# Patient Record
Sex: FEMALE | Race: WHITE | NOT HISPANIC OR LATINO | Employment: OTHER | URBAN - METROPOLITAN AREA
[De-identification: names, ages, dates, MRNs, and addresses within clinical notes are randomized per-mention and may not be internally consistent; named-entity substitution may affect disease eponyms.]

---

## 2021-04-05 ENCOUNTER — TELEPHONE (OUTPATIENT)
Dept: PEDIATRICS | Facility: CLINIC | Age: 66
End: 2021-04-05

## 2021-04-13 ENCOUNTER — ANCILLARY PROCEDURE (OUTPATIENT)
Dept: NUCLEAR MEDICINE | Facility: CLINIC | Age: 66
End: 2021-04-13
Attending: PSYCHIATRY & NEUROLOGY
Payer: COMMERCIAL

## 2021-04-13 DIAGNOSIS — R20.2 TINGLING: ICD-10-CM

## 2021-04-13 DIAGNOSIS — R25.1 TREMOR, PHYSIOLOGICAL: ICD-10-CM

## 2021-04-13 DIAGNOSIS — G20.A1 PARKINSONS (H): ICD-10-CM

## 2021-04-13 DIAGNOSIS — M48.02 CERVICAL SPINAL STENOSIS: ICD-10-CM

## 2021-04-13 PROCEDURE — A9584 IODINE I-123 IOFLUPANE: HCPCS | Performed by: RADIOLOGY

## 2021-04-13 PROCEDURE — 78803 RP LOCLZJ TUM SPECT 1 AREA: CPT | Performed by: RADIOLOGY

## 2022-01-28 ENCOUNTER — OFFICE VISIT (OUTPATIENT)
Dept: DERMATOLOGY | Facility: CLINIC | Age: 67
End: 2022-01-28
Payer: COMMERCIAL

## 2022-01-28 DIAGNOSIS — L43.9 LICHEN PLANUS: Primary | ICD-10-CM

## 2022-01-28 DIAGNOSIS — R45.851 SUICIDAL IDEATION: ICD-10-CM

## 2022-01-28 PROCEDURE — 99204 OFFICE O/P NEW MOD 45 MIN: CPT | Performed by: DERMATOLOGY

## 2022-01-28 RX ORDER — HYDROXYCHLOROQUINE SULFATE 200 MG/1
TABLET, FILM COATED ORAL
Qty: 48 TABLET | Refills: 2 | Status: SHIPPED | OUTPATIENT
Start: 2022-01-28

## 2022-01-28 RX ORDER — L. ACIDOPHILUS/BIFIDO. LONGUM 15 MG
CAPSULE,DELAYED RELEASE (ENTERIC COATED) ORAL
COMMUNITY
Start: 2021-06-02

## 2022-01-28 RX ORDER — GABAPENTIN 300 MG/1
CAPSULE ORAL
COMMUNITY
Start: 2021-12-23

## 2022-01-28 RX ORDER — PAROXETINE 40 MG/1
TABLET, FILM COATED ORAL
COMMUNITY
Start: 2021-12-26

## 2022-01-28 RX ORDER — SODIUM FLUORIDE 6 MG/ML
PASTE, DENTIFRICE DENTAL
COMMUNITY
Start: 2021-07-05

## 2022-01-28 RX ORDER — HYDROCORTISONE 2.5 %
CREAM (GRAM) TOPICAL 2 TIMES DAILY
COMMUNITY

## 2022-01-28 RX ORDER — CLOBETASOL PROPIONATE 0.05 MG/G
GEL TOPICAL
COMMUNITY
Start: 2021-02-10

## 2022-01-28 RX ORDER — PROCHLORPERAZINE MALEATE 10 MG
TABLET ORAL
COMMUNITY
Start: 2021-12-23

## 2022-01-28 RX ORDER — CYCLOBENZAPRINE HCL 10 MG
TABLET ORAL
COMMUNITY
Start: 2021-12-25

## 2022-01-28 RX ORDER — MUPIROCIN 20 MG/G
OINTMENT TOPICAL 3 TIMES DAILY
COMMUNITY

## 2022-01-28 RX ORDER — FOLIC ACID 1 MG/1
1 TABLET ORAL DAILY
COMMUNITY

## 2022-01-28 RX ORDER — SIMVASTATIN 40 MG
40 TABLET ORAL DAILY
COMMUNITY
Start: 2021-12-23

## 2022-01-28 RX ORDER — NAPROXEN 250 MG/1
TABLET ORAL
COMMUNITY

## 2022-01-28 RX ORDER — MICONAZOLE NITRATE 20 MG/G
POWDER TOPICAL
COMMUNITY
Start: 2020-07-16

## 2022-01-28 RX ORDER — METFORMIN HCL 500 MG
1000 TABLET, EXTENDED RELEASE 24 HR ORAL
COMMUNITY
Start: 2021-06-02

## 2022-01-28 RX ORDER — GLIMEPIRIDE 4 MG/1
TABLET ORAL
COMMUNITY
Start: 2021-12-26

## 2022-01-28 RX ORDER — ACYCLOVIR 800 MG/1
800 TABLET ORAL
COMMUNITY

## 2022-01-28 RX ORDER — ALBUTEROL SULFATE 0.83 MG/ML
2.5 SOLUTION RESPIRATORY (INHALATION)
COMMUNITY
Start: 2021-12-23

## 2022-01-28 RX ORDER — SUMATRIPTAN 50 MG/1
50 TABLET, FILM COATED ORAL
COMMUNITY
Start: 2020-12-04

## 2022-01-28 RX ORDER — NYSTATIN 100000/ML
500000 SUSPENSION, ORAL (FINAL DOSE FORM) ORAL 4 TIMES DAILY
COMMUNITY

## 2022-01-28 RX ORDER — TOPIRAMATE 25 MG/1
1 TABLET, FILM COATED ORAL 2 TIMES DAILY
COMMUNITY
Start: 2021-12-22

## 2022-01-28 ASSESSMENT — PAIN SCALES - GENERAL: PAINLEVEL: NO PAIN (0)

## 2022-01-28 ASSESSMENT — PATIENT HEALTH QUESTIONNAIRE - PHQ9: SUM OF ALL RESPONSES TO PHQ QUESTIONS 1-9: 22

## 2022-01-28 NOTE — PATIENT INSTRUCTIONS
"Increase methotrexate to 5 pills once weekly  Continue folic acid  Please get labs checked in [facility] 1 month after increasing dose  Please call me after you get labs checked to let me know to review them    Start plaquenil 200 mg twice daily Mon-Fri and 200 mg once daily Sat/Sun  Get baseline eye exam with ophthalmologist and let them know you need Plaquenil screen    If rash on forearms flares up please let me know     Return in 3 months sooner if concerns.         Oral Health Care    You have been diagnosed with one of several diseases that can affect the mucous membranes inside your mouth. Many times, these conditions produce sensitive, painful gums or oral discomfort. In most cases, meticulous oral hygiene is recommended to assist in the management of these problems. This handout is designed to provide pertinent information regarding non-prescription products that can be used to maintain or improve the condition of your mouth. The products listed only represent a sample of what is available without a prescription and is not intended to represent a comprehensive list of over-the-counter oral health care products. If you have any questions, please contact your dentist, dental hygienist, or pharmacist.     Toothbrushes    Toothbrushes are available in a variety of shapes, sizes and degrees of stiffness in bristles. Even regular \"soft\" toothbrushes may be too \"hard\" to be used effectively for your specific condition and can damage affected gum tissue. Several options are available. In cases where there is significant gum sensitivity, running the head of the toothbrush under warm running water is often helpful to further soften the bristles. One of the best toothbrushes available is manufactured by [employer] and can be ordered by your pharmacist. Other manufacturers provide alternative toothbrushes for sensitive oral tissues. Oral-B provides their AdvantageTM brush in a \"sensitive\" model with extra-soft " "bristles. The Crest CompleteTM is also available with an \"extra-soft\" bristle option. The TechniqueTM toothbrush by Karen with \"sensitive\" bristles is indicted for use with oral tissues that are irritated. If these are not available, you can try to find a \"baby\" toothbrush that will have a small head and very soft bristles. Lastly, you can have your dentist order a specific tooth brush.    Toothpastes    Regular toothpastes are often very irritating to inflamed or ulcerated oral tissues. The \"foaming\" agent in most toothpastes, sodium lauryl sulfate or SLS, is often responsible. There are several toothpastes that are SLS-free or have substantially reduced levels of SLS. Other toothpastes for \"tartar control\" contain a pyrophosphate that may be irritating to normal healthy oral tissue in some patients. Toothpastes with these ingredients should be avoided in individuals with conditions like yours. You should carefully review the ingredient on the back of the box or tube of the toothpaste prior to use. BioteneTM toothpaste is SLS-free and also contains protective enzymes that are not normally found in saliva. It is manufactured by [employer] and can be ordered by your pharmacist. RembrandtTM Toothpaste for Canker Sore Sufferers is also SLS-free and is available in most pharmacies. Finally, [employer] toothpastes have minimal levels of SLS and are available in health food stores and many grocery stores.      How to brush your teeth    When the gums are inflamed and irritated, it is especially important that you brush your teeth correctly to remove the daily accumulation of bacteria-laden dental plaque. The \"Bass technique\" is widely accepted as an effective method to remove plaque from the teeth and surrounding gums. Using light pressure and holding the bristles at a 45-degree angle to the teeth, gently slide the tips of the bristles just slightly under the gums. You should begin on the cheek side of the upper " "back teeth and either vibrate the bristles in a gentle back-and-forth motion or move them in very small circles. In either case, do not remove the tips of the bristles from under the gum-line. As you go around the mouth to the other side and get to the last tooth, you should continue onto the palate side. Make sure to reposition the brush to the next group of 2-3 teeth, taking care to overlap placement. Once you have finished with the upper teeth, you should brush the lower teeth in exactly the same way.     Electric toothbrushes come in a variety of models. While they all do a good job of removing bacteria and food debris from the teeth and gums, none of them are superior to regular brushing and flossing. Patients who have blistering diseases that affect their gums may find that electric toothbrushes are too rough on their gums and cause them to become eroded.     Dental Floss    Dental floss is used to remove bacteria and food debris from between the teeth and under the gum line. There are several different variations of dental floss, including dental \"tape\", waxed/coated floss and flavored floss. Which one to get is a matter of personal preference; however, most patients find that a waxed/coated dental floss is easier to get between the teeth. There are also certain types of flosses that may be more \"gentle\" to the gum tissue. Oral-B UltraflossTM has spongy nylon fibers that can help to remove more plaque. Jones's Expanding flossTM with texturized fibers is considered ideal for patients with gingival sensitivity. GlideTM floss is coated with a Alzada-adeline-like material and is noted for providing increased patient comfort.     Flossing is not a complicated task. Most individuals simply wrap the end of the floss around their middle or ring fingers, stretch the floss between their index fingers and thumb, and gently \"see-saw\" the floss between the teeth until the floss is below the contact point. Once the floss is " "below the contact, you should wrap the floss around the side of the tooth in a \"C-shape\" and use an up-and-down motion, making sure to access below the gum-line. Do not simply pull the floss and \"snap\" it through the contact point, as it will cut the gums.     Mouth rinses    Many people use mouth rinses to \"freshen\" their breath; however, mouth rinses will not remove the bacteria that cause halitosis or bad breath. They only temporarily cover up the odor and are considered cosmetic mouth rinses. Patients who practice good oral hygiene usually do not need to use a mouth rinse, but may want to do so as a matter of personal preference.     The choice of mouth rinse depends on several factors. Many popular, over-the-counter mouth rinses contain alcohol ranging from 15-30%. While there is no scientific evidence that alcohol in mouthwashes has any significant negative health effects on humans, the presence of alcohol may cause a stinging sensation, especially if the gum tissue is ulcerated. Alcohol containing rinses also produce dryness of the oral mucosal tissues. This condition, known as xerostomia, may contribute to the already existing problem. Most manufacturers have, or plan to develop, alcohol-free mouthwashes. BioteneTM mouthwash by [facility] contains protective enzymes normally found in saliva and is also alcohol-free. Bausch and LombTM is another  that provides an alcohol-free mouthrinse. PeroxylTM mouthrinse, manufactured by [employer], is a hydrogen peroxide based product, that is often helpful in soothing a variety of mouth sores and irritations.     More Information    The World Wide Web provides an expansive source of information regarding oral health care products. The American Dental Association has an excellent web site at http://www.ada.org/. Those products that have received the ADA Seal of Acceptance can be found at http://www.ada.org/public/topics/seal.html. This site also lists contact " information for the companies whose products have received the ADA Seal of Acceptance. The vast majority of these products and their manufacturers have their own web sites, as well.

## 2022-01-28 NOTE — NURSING NOTE
Dermatology Rooming Note    Claudette Mitchell's goals for this visit include:   Chief Complaint   Patient presents with     Derm Problem     Lichen Planus- areas of concern in the mouth. Claudette reports a fluctuating rash on both arms     Kathryn Lugo, EMT

## 2022-01-28 NOTE — LETTER
1/28/2022       RE: Claudette Mitchell  [id number]  [place] [address]     Dear Colleague,    Thank you for referring your patient, Claudette Mitchell, to the [facility] [place] at [facility]. Please see a copy of my visit note below.    [facility] Dermatology Note  Encounter Date: Jan 28, 2022  Office Visit     Dermatology Problem List:  # Oral lichen planus, biopsy confirmed 2016.    - Pt reports negative HCV testing.    - Current rx: Methotrexate 10 mg weekly, initiated 3/10/19 (feeling jittery with 6 tablets / week). Approximately cumulative methotrexate dosage as of 1/28/22: 1,390 mg; start Plaquenil 1/28/22 and increase methotrexate to 5 tablets weekly (12.5 mg)  - Previous rx: Cellcept (severe headaches).   # Craniofacial hyperhidrosis  - Current Rx: compazine through PCP    ____________________________________________    Assessment & Plan:    # Oral lichen planus, s/p bx 2016, chronic, active.  Patient with active disease on methotrexate 10 mg weekly. She did not tolerate methotrexate 15 mg weekly. She previously failed Cellcept and topicals. Discussed options of increasing methotrexate to 12.5 mg weekly +/- adding Plaquenil versus Otezla. Given depression, thoughts of suicide, and weight loss in setting of death of her sister last year, avoiding Otezla for now.  - Increase methotrexate to 12.5 mg weekly  - Recheck CBC, CMP in 1 month  - Start Plaquenil 200 mg BID Mon-Fri and 200 mg daily Sat/Sun (weight-based)  - Obtain baseline eye exam  - Continue clobetasol gel; advised to use with dental trays; offered dexamethasone swish/spit but she will hold off for now  - Advised regular dental cares  - Offered GI referral given swallowing symptoms but she would to defer for now  *reviewed multiple outside derm notes, prior CBC, CMP    # Rash on forearms.  - Minimally present today, mostly appears xerotic  - Based  "on description, could be cutaneous LP  - Offered topical steroid but she will defer for now and let us know when is active  - Advised daily moisturization with bland emollient.     # High PHQ-9.  # Suicidal thoughts.  Patient notes suicidal thoughts but \"would never act on them\". Her sister  last year. She is in grief therapy. Declines additional resources.  - Please see RN note for documentation      Procedures Performed:   None    Follow-up: 3 months, sooner if concerns.      Staff and Scribe:     Scribe Disclosure:  I, Princess Stone, am serving as a scribe to document services personally performed by Vanessa Rosado MD based on data collection and the provider's statements to me.     Provider Disclosure:   The documentation recorded by the scribe accurately reflects the services I personally performed and the decisions made by me.    Vanessa Rosado MD    Department of Dermatology  [facility]: Phone: [phone], Fax: [phone]  2022     ____________________________________________    CC: Derm Problem (Lichen Planus- areas of concern in the mouth. Claudette reports a fluctuating rash on both arms)    HPI:  Ms. Claudette Mitchell is a(n) 66 year old female who presents today as a new patient for lichen planus. Referred by Dr. Nohemy Badillo.    Diagnosed in 2016.  Takes methotrexate 4 pills weekly. Was jittery on 6 pills. It does help. Stopped at one point but then mouth blew up.  Previously took cellcept but had severe headches.  clobetasol gel didn't help much.    No sores in vaginal area.  Trouble swallowing pills, has pain when swallowing  Has lost almost 100 lbs - mostly related to death of sister last year.  Has thoughts of suicide but would not act on it.  Goes to grief therapy weekly.    Also gets rash on forearms. Will get raised bumps.  Tried changing soaps but that didn't help.    Patient is " otherwise feeling well, without additional skin concerns.    Labs Reviewed:  See above  Physical Exam:  Vitals: There were no vitals taken for this visit.  SKIN: Focused examination of oral mucosa was performed.  - diffuse erythema along superior/inferior gingiva with one erosion left superior gingiva.  - No other lesions of concern on areas examined.     Medications:  Current Outpatient Medications   Medication     acyclovir (ZOVIRAX) 800 MG tablet     albuterol (PROVENTIL) (2.5 MG/3ML) 0.083% neb solution     blood glucose ([employer] BLOOD GLUCOSE TEST) test strip     clobetasol (TEMOVATE) 0.05 % GEL topical gel     cyclobenzaprine (FLEXERIL) 10 MG tablet     folic acid (FOLVITE) 1 MG tablet     gabapentin (NEURONTIN) 300 MG capsule     glimepiride (AMARYL) 4 MG tablet     hydrocortisone 2.5 % cream     hydroxychloroquine (PLAQUENIL) 200 MG tablet     metFORMIN (GLUCOPHAGE-XR) 500 MG 24 hr tablet     methotrexate 2.5 MG tablet     miconazole (ZEASORB-AF) 2 % external powder     mupirocin (BACTROBAN) 2 % external ointment     naproxen (NAPROSYN) 250 MG tablet     nystatin (MYCOSTATIN) 354195 UNIT/ML suspension     PARoxetine (PAXIL) 40 MG tablet     prochlorperazine (COMPAZINE) 10 MG tablet     simvastatin (ZOCOR) 40 MG tablet     SODIUM FLUORIDE 5000 PPM 1.1 % PSTE     SUMAtriptan (IMITREX) 50 MG tablet     topiramate (TOPAMAX) 25 MG tablet     vitamin B-12 (CYANOCOBALAMIN) 1000 MCG tablet     No current facility-administered medications for this visit.      Past Medical History:   There is no problem list on file for this patient.    No past medical history on file.     CC Referred Self, MD  No address on file on close of this encounter.

## 2022-01-28 NOTE — PROGRESS NOTES
"[facility] Dermatology Note  Encounter Date: 2022  Office Visit     Dermatology Problem List:  # Oral lichen planus, biopsy confirmed .    - Pt reports negative HCV testing.    - Current rx: Methotrexate 10 mg weekly, initiated 3/10/19 (feeling jittery with 6 tablets / week). Approximately cumulative methotrexate dosage as of 22: 1,390 mg; start Plaquenil 22 and increase methotrexate to 5 tablets weekly (12.5 mg)  - Previous rx: Cellcept (severe headaches).   # Craniofacial hyperhidrosis  - Current Rx: compazine through PCP    ____________________________________________    Assessment & Plan:    # Oral lichen planus, s/p bx , chronic, active.  Patient with active disease on methotrexate 10 mg weekly. She did not tolerate methotrexate 15 mg weekly. She previously failed Cellcept and topicals. Discussed options of increasing methotrexate to 12.5 mg weekly +/- adding Plaquenil versus Otezla. Given depression, thoughts of suicide, and weight loss in setting of death of her sister last year, avoiding Otezla for now.  - Increase methotrexate to 12.5 mg weekly  - Recheck CBC, CMP in 1 month  - Start Plaquenil 200 mg BID Mon-Fri and 200 mg daily Sat/Sun (weight-based)  - Obtain baseline eye exam  - Continue clobetasol gel; advised to use with dental trays; offered dexamethasone swish/spit but she will hold off for now  - Advised regular dental cares  - Offered GI referral given swallowing symptoms but she would to defer for now  *reviewed multiple outside derm notes, prior CBC, CMP    # Rash on forearms.  - Minimally present today, mostly appears xerotic  - Based on description, could be cutaneous LP  - Offered topical steroid but she will defer for now and let us know when is active  - Advised daily moisturization with bland emollient.     # High PHQ-9.  # Suicidal thoughts.  Patient notes suicidal thoughts but \"would never act on them\". Her sister  last year. She is in " grief therapy. Declines additional resources.  - Please see RN note for documentation      Procedures Performed:   None    Follow-up: 3 months, sooner if concerns.      Staff and Scribe:     Scribe Disclosure:  I, Princess Chase, am serving as a scribe to document services personally performed by Vanessa Rosado MD based on data collection and the provider's statements to me.     Provider Disclosure:   The documentation recorded by the scribe accurately reflects the services I personally performed and the decisions made by me.    Vanessa Rosado MD    Department of Dermatology  [facility]: Phone: [phone], Fax: [phone]  1/30/2022     ____________________________________________    CC: Derm Problem (Lichen Planus- areas of concern in the mouth. Claudette reports a fluctuating rash on both arms)    HPI:  Ms. Claudette Mitchell is a(n) 66 year old female who presents today as a new patient for lichen planus. Referred by Dr. Nohemy Badillo.    Diagnosed in 2016.  Takes methotrexate 4 pills weekly. Was jittery on 6 pills. It does help. Stopped at one point but then mouth blew up.  Previously took cellcept but had severe headches.  clobetasol gel didn't help much.    No sores in vaginal area.  Trouble swallowing pills, has pain when swallowing  Has lost almost 100 lbs - mostly related to death of sister last year.  Has thoughts of suicide but would not act on it.  Goes to grief therapy weekly.    Also gets rash on forearms. Will get raised bumps.  Tried changing soaps but that didn't help.    Patient is otherwise feeling well, without additional skin concerns.    Labs Reviewed:  See above  Physical Exam:  Vitals: There were no vitals taken for this visit.  SKIN: Focused examination of oral mucosa was performed.  - diffuse erythema along superior/inferior gingiva with one erosion left superior gingiva.  - No other lesions  of concern on areas examined.     Medications:  Current Outpatient Medications   Medication     acyclovir (ZOVIRAX) 800 MG tablet     albuterol (PROVENTIL) (2.5 MG/3ML) 0.083% neb solution     blood glucose ([facility] BLOOD GLUCOSE TEST) test strip     clobetasol (TEMOVATE) 0.05 % GEL topical gel     cyclobenzaprine (FLEXERIL) 10 MG tablet     folic acid (FOLVITE) 1 MG tablet     gabapentin (NEURONTIN) 300 MG capsule     glimepiride (AMARYL) 4 MG tablet     hydrocortisone 2.5 % cream     hydroxychloroquine (PLAQUENIL) 200 MG tablet     metFORMIN (GLUCOPHAGE-XR) 500 MG 24 hr tablet     methotrexate 2.5 MG tablet     miconazole (ZEASORB-AF) 2 % external powder     mupirocin (BACTROBAN) 2 % external ointment     naproxen (NAPROSYN) 250 MG tablet     nystatin (MYCOSTATIN) 991767 UNIT/ML suspension     PARoxetine (PAXIL) 40 MG tablet     prochlorperazine (COMPAZINE) 10 MG tablet     simvastatin (ZOCOR) 40 MG tablet     SODIUM FLUORIDE 5000 PPM 1.1 % PSTE     SUMAtriptan (IMITREX) 50 MG tablet     topiramate (TOPAMAX) 25 MG tablet     vitamin B-12 (CYANOCOBALAMIN) 1000 MCG tablet     No current facility-administered medications for this visit.      Past Medical History:   There is no problem list on file for this patient.    No past medical history on file.     CC Referred Self, MD  No address on file on close of this encounter.

## 2022-01-28 NOTE — NURSING NOTE
[facility] :  PHQ-9 Screening Note  SITUATION/BACKGROUND                                                    Claudette Mitchell is a 66 year old female who completed the PHQ-9 assessment for depression and Score is >9.    Onset of symptoms: improving  Trigger: sister death  Recent related events: sisters death  Prior history of suicide attempt or self harm: No  Risk Factors: recent loss of sister  History of depression or mental illness:   Medications reviewed: No     ASSESSMENT      A. Are any of the following present?      Suicidal thoughts with a plan and means to carry out the plan?    Intent to harm others    Altered mental status: confusion, delusional, psychotic NO - go to B   B. Are any of the following present?      Suicidal thoughts without a plan or means to carry out the plan    New onset of delusional ideas    Past inpatient admission for depression    New onset and recent change or addition of new medication NO - go to C   C. Are any of the following present?      Previous suicide attempts    Depression interfering with ability to work or function    Loss of appetite and eating poorly    Abrupt cessation of drugs (OTC or RX), alcohol or caffeine    Drug or alcohol abuse NO - go to D   D. Are several of the following present?      Difficulty concentrating    Difficulty sleeping    Reduced interest in sexual activity or impotency    Irregular or absent menstruation    No interest in activity    Change in interpersonal relationships    Increased use/abuse of alcohol or drugs    Pregnant or recent child birth    Recent major life change    History of depression NO - provide home care instructions    Patient is in a grief group. Feels she is getting better. Declines referral       PLAN      Home Care Instructions:   If currently in counseling, call counselor for appointment  Call local crisis interventions  Contact friends or family for support  Increase exercise and enjoyable activities  East a  well-balanced diet, drink plenty of fluids and rest as needed    Report the following to your PCP:   Depression interferes with daily activities    Seek emergency care immediately if any of the following occur:   Suicidal thoughts and plan and means to carry out the plan    BEHAVIORAL HEALTH TEAMS      [facility] - Behavioral Health Team    [facility] Pager: [phone]    [place]  - Behavioral Health Team    Pager number: [phone]    Referral to Behavioral Health    BEHAVIORAL / SPIRITUAL HEALTH SOWQ [08915}    RESOURCES      - 24/7 Crisis Hotlines: National Suicide Prevention Hotline  601-142-XOEH (7515)    Stephania Amaya, RN        Copyright 2016 [employer] [employer]

## 2022-02-14 ENCOUNTER — TELEPHONE (OUTPATIENT)
Dept: DERMATOLOGY | Facility: CLINIC | Age: 67
End: 2022-02-14
Payer: COMMERCIAL

## 2022-02-14 NOTE — TELEPHONE ENCOUNTER
[facility] Call Center    Phone Message    May a detailed message be left on voicemail: yes     Reason for Call: Medication Refill Request    Has the patient contacted the pharmacy for the refill? Yes   Name of medication being requested: methotrexate 2.5 MG tablet  Provider who prescribed the medication: Dr. Rosado  Pharmacy: [facility] - [address]  Date medication is needed: Now     Action Taken: Message routed to:  [facility] ([facility]): Med Refill    Travel Screening: Not Applicable

## 2022-02-14 NOTE — TELEPHONE ENCOUNTER
methotrexate 2.5 MG tablet RF avaiable  Last Rx: 1-28-22 for 20 tab w/1 RF.  Pharm called - confirmed. Per pharm pt needs to call and set up delivery address/date ( [phone])  Above message left on pt voice mail.

## 2022-09-19 ENCOUNTER — LAB REQUISITION (OUTPATIENT)
Dept: LAB | Facility: CLINIC | Age: 67
End: 2022-09-19

## 2022-09-19 DIAGNOSIS — E08.9 DIABETES MELLITUS DUE TO UNDERLYING CONDITION WITHOUT COMPLICATIONS (H): ICD-10-CM

## 2022-09-20 ENCOUNTER — LAB REQUISITION (OUTPATIENT)
Dept: LAB | Facility: CLINIC | Age: 67
End: 2022-09-20
Payer: COMMERCIAL

## 2022-09-20 DIAGNOSIS — E08.9 DIABETES MELLITUS DUE TO UNDERLYING CONDITION WITHOUT COMPLICATIONS (H): ICD-10-CM

## 2022-09-21 LAB
ANION GAP SERPL CALCULATED.3IONS-SCNC: 10 MMOL/L (ref 7–15)
BUN SERPL-MCNC: 18.9 MG/DL (ref 8–23)
CALCIUM SERPL-MCNC: 9.4 MG/DL (ref 8.8–10.2)
CHLORIDE SERPL-SCNC: 102 MMOL/L (ref 98–107)
CREAT SERPL-MCNC: 0.78 MG/DL (ref 0.51–0.95)
DEPRECATED HCO3 PLAS-SCNC: 26 MMOL/L (ref 22–29)
GFR SERPL CREATININE-BSD FRML MDRD: 82 ML/MIN/1.73M2
GLUCOSE SERPL-MCNC: 66 MG/DL (ref 70–99)
HBA1C MFR BLD: 5.7 %
POTASSIUM SERPL-SCNC: 4.7 MMOL/L (ref 3.4–5.3)
SODIUM SERPL-SCNC: 138 MMOL/L (ref 136–145)
TSH SERPL DL<=0.005 MIU/L-ACNC: 6.72 UIU/ML (ref 0.3–4.2)

## 2022-09-21 PROCEDURE — P9604 ONE-WAY ALLOW PRORATED TRIP: HCPCS | Mod: ORL | Performed by: NURSE PRACTITIONER

## 2022-09-21 PROCEDURE — 84443 ASSAY THYROID STIM HORMONE: CPT | Mod: ORL | Performed by: NURSE PRACTITIONER

## 2022-09-21 PROCEDURE — 83036 HEMOGLOBIN GLYCOSYLATED A1C: CPT | Mod: ORL | Performed by: NURSE PRACTITIONER

## 2022-09-21 PROCEDURE — 80048 BASIC METABOLIC PNL TOTAL CA: CPT | Mod: ORL | Performed by: NURSE PRACTITIONER

## 2022-09-21 PROCEDURE — 36415 COLL VENOUS BLD VENIPUNCTURE: CPT | Mod: ORL | Performed by: NURSE PRACTITIONER

## 2023-01-30 ENCOUNTER — LAB REQUISITION (OUTPATIENT)
Dept: LAB | Facility: CLINIC | Age: 68
End: 2023-01-30
Payer: COMMERCIAL

## 2023-01-30 DIAGNOSIS — E08.9 DIABETES MELLITUS DUE TO UNDERLYING CONDITION WITHOUT COMPLICATIONS (H): ICD-10-CM

## 2023-01-31 LAB
BASOPHILS # BLD AUTO: 0.1 10E3/UL (ref 0–0.2)
BASOPHILS NFR BLD AUTO: 1 %
EOSINOPHIL # BLD AUTO: 0.3 10E3/UL (ref 0–0.7)
EOSINOPHIL NFR BLD AUTO: 4 %
ERYTHROCYTE [DISTWIDTH] IN BLOOD BY AUTOMATED COUNT: 14.3 % (ref 10–15)
HCT VFR BLD AUTO: 41.3 % (ref 35–47)
HGB BLD-MCNC: 13.6 G/DL (ref 11.7–15.7)
IMM GRANULOCYTES # BLD: 0 10E3/UL
IMM GRANULOCYTES NFR BLD: 0 %
LYMPHOCYTES # BLD AUTO: 2.5 10E3/UL (ref 0.8–5.3)
LYMPHOCYTES NFR BLD AUTO: 37 %
MCH RBC QN AUTO: 33.2 PG (ref 26.5–33)
MCHC RBC AUTO-ENTMCNC: 32.9 G/DL (ref 31.5–36.5)
MCV RBC AUTO: 101 FL (ref 78–100)
MONOCYTES # BLD AUTO: 0.6 10E3/UL (ref 0–1.3)
MONOCYTES NFR BLD AUTO: 9 %
NEUTROPHILS # BLD AUTO: 3.3 10E3/UL (ref 1.6–8.3)
NEUTROPHILS NFR BLD AUTO: 49 %
NRBC # BLD AUTO: 0 10E3/UL
NRBC BLD AUTO-RTO: 0 /100
PLATELET # BLD AUTO: 265 10E3/UL (ref 150–450)
RBC # BLD AUTO: 4.1 10E6/UL (ref 3.8–5.2)
TSH SERPL DL<=0.005 MIU/L-ACNC: 3.93 UIU/ML (ref 0.3–4.2)
WBC # BLD AUTO: 6.7 10E3/UL (ref 4–11)

## 2023-01-31 PROCEDURE — P9604 ONE-WAY ALLOW PRORATED TRIP: HCPCS | Mod: ORL | Performed by: NURSE PRACTITIONER

## 2023-01-31 PROCEDURE — 84443 ASSAY THYROID STIM HORMONE: CPT | Mod: ORL | Performed by: NURSE PRACTITIONER

## 2023-01-31 PROCEDURE — 36415 COLL VENOUS BLD VENIPUNCTURE: CPT | Mod: ORL | Performed by: NURSE PRACTITIONER

## 2023-01-31 PROCEDURE — 85025 COMPLETE CBC W/AUTO DIFF WBC: CPT | Mod: ORL | Performed by: NURSE PRACTITIONER

## 2023-08-07 ENCOUNTER — LAB REQUISITION (OUTPATIENT)
Dept: LAB | Facility: CLINIC | Age: 68
End: 2023-08-07
Payer: COMMERCIAL

## 2023-08-07 DIAGNOSIS — L43.9 LICHEN PLANUS, UNSPECIFIED: ICD-10-CM

## 2023-08-07 DIAGNOSIS — E83.42 HYPOMAGNESEMIA: ICD-10-CM

## 2023-08-07 DIAGNOSIS — E11.9 TYPE 2 DIABETES MELLITUS WITHOUT COMPLICATIONS (H): ICD-10-CM

## 2023-08-08 LAB
ALBUMIN SERPL BCG-MCNC: 4.3 G/DL (ref 3.5–5.2)
ALP SERPL-CCNC: 87 U/L (ref 35–104)
ALT SERPL W P-5'-P-CCNC: 8 U/L (ref 0–50)
ANION GAP SERPL CALCULATED.3IONS-SCNC: 10 MMOL/L (ref 7–15)
AST SERPL W P-5'-P-CCNC: 34 U/L (ref 0–45)
BASOPHILS # BLD AUTO: 0.1 10E3/UL (ref 0–0.2)
BASOPHILS NFR BLD AUTO: 1 %
BILIRUB SERPL-MCNC: 0.3 MG/DL
BUN SERPL-MCNC: 17.9 MG/DL (ref 8–23)
CALCIUM SERPL-MCNC: 9.3 MG/DL (ref 8.8–10.2)
CHLORIDE SERPL-SCNC: 104 MMOL/L (ref 98–107)
CREAT SERPL-MCNC: 0.71 MG/DL (ref 0.51–0.95)
DEPRECATED HCO3 PLAS-SCNC: 26 MMOL/L (ref 22–29)
EOSINOPHIL # BLD AUTO: 0.2 10E3/UL (ref 0–0.7)
EOSINOPHIL NFR BLD AUTO: 4 %
ERYTHROCYTE [DISTWIDTH] IN BLOOD BY AUTOMATED COUNT: 14.3 % (ref 10–15)
GFR SERPL CREATININE-BSD FRML MDRD: >90 ML/MIN/1.73M2
GLUCOSE SERPL-MCNC: 98 MG/DL (ref 70–99)
HBA1C MFR BLD: 6.2 %
HCT VFR BLD AUTO: 44.4 % (ref 35–47)
HGB BLD-MCNC: 14.3 G/DL (ref 11.7–15.7)
IMM GRANULOCYTES # BLD: 0 10E3/UL
IMM GRANULOCYTES NFR BLD: 0 %
LYMPHOCYTES # BLD AUTO: 2.5 10E3/UL (ref 0.8–5.3)
LYMPHOCYTES NFR BLD AUTO: 44 %
MAGNESIUM SERPL-MCNC: 2 MG/DL (ref 1.7–2.3)
MCH RBC QN AUTO: 32.8 PG (ref 26.5–33)
MCHC RBC AUTO-ENTMCNC: 32.2 G/DL (ref 31.5–36.5)
MCV RBC AUTO: 102 FL (ref 78–100)
MONOCYTES # BLD AUTO: 0.5 10E3/UL (ref 0–1.3)
MONOCYTES NFR BLD AUTO: 8 %
NEUTROPHILS # BLD AUTO: 2.5 10E3/UL (ref 1.6–8.3)
NEUTROPHILS NFR BLD AUTO: 43 %
NRBC # BLD AUTO: 0 10E3/UL
NRBC BLD AUTO-RTO: 0 /100
PLATELET # BLD AUTO: 280 10E3/UL (ref 150–450)
POTASSIUM SERPL-SCNC: 4.7 MMOL/L (ref 3.4–5.3)
PROT SERPL-MCNC: 6.8 G/DL (ref 6.4–8.3)
RBC # BLD AUTO: 4.36 10E6/UL (ref 3.8–5.2)
SODIUM SERPL-SCNC: 140 MMOL/L (ref 136–145)
WBC # BLD AUTO: 5.7 10E3/UL (ref 4–11)

## 2023-08-08 PROCEDURE — 83735 ASSAY OF MAGNESIUM: CPT | Mod: ORL | Performed by: NURSE PRACTITIONER

## 2023-08-08 PROCEDURE — P9604 ONE-WAY ALLOW PRORATED TRIP: HCPCS | Mod: ORL | Performed by: NURSE PRACTITIONER

## 2023-08-08 PROCEDURE — 80053 COMPREHEN METABOLIC PANEL: CPT | Mod: ORL | Performed by: NURSE PRACTITIONER

## 2023-08-08 PROCEDURE — 36415 COLL VENOUS BLD VENIPUNCTURE: CPT | Mod: ORL | Performed by: NURSE PRACTITIONER

## 2023-08-08 PROCEDURE — 85025 COMPLETE CBC W/AUTO DIFF WBC: CPT | Mod: ORL | Performed by: NURSE PRACTITIONER

## 2023-08-08 PROCEDURE — 83036 HEMOGLOBIN GLYCOSYLATED A1C: CPT | Mod: ORL | Performed by: NURSE PRACTITIONER
